# Patient Record
Sex: FEMALE | Race: BLACK OR AFRICAN AMERICAN | NOT HISPANIC OR LATINO | Employment: STUDENT | URBAN - METROPOLITAN AREA
[De-identification: names, ages, dates, MRNs, and addresses within clinical notes are randomized per-mention and may not be internally consistent; named-entity substitution may affect disease eponyms.]

---

## 2018-09-10 ENCOUNTER — OFFICE VISIT (OUTPATIENT)
Dept: OBSTETRICS AND GYNECOLOGY | Facility: CLINIC | Age: 18
End: 2018-09-10
Payer: MEDICAID

## 2018-09-10 VITALS
WEIGHT: 226.06 LBS | HEIGHT: 69 IN | BODY MASS INDEX: 33.48 KG/M2 | SYSTOLIC BLOOD PRESSURE: 110 MMHG | DIASTOLIC BLOOD PRESSURE: 80 MMHG

## 2018-09-10 DIAGNOSIS — Z01.419 ENCOUNTER FOR GYNECOLOGICAL EXAMINATION WITHOUT ABNORMAL FINDING: Primary | ICD-10-CM

## 2018-09-10 PROCEDURE — 88175 CYTOPATH C/V AUTO FLUID REDO: CPT

## 2018-09-10 PROCEDURE — 99385 PREV VISIT NEW AGE 18-39: CPT | Mod: S$PBB,,, | Performed by: OBSTETRICS & GYNECOLOGY

## 2018-09-10 PROCEDURE — 99999 PR PBB SHADOW E&M-EST. PATIENT-LVL III: CPT | Mod: PBBFAC,,, | Performed by: OBSTETRICS & GYNECOLOGY

## 2018-09-10 PROCEDURE — 99213 OFFICE O/P EST LOW 20 MIN: CPT | Mod: PBBFAC,PN | Performed by: OBSTETRICS & GYNECOLOGY

## 2018-09-10 NOTE — PROGRESS NOTES
PT HERE FOR FIRST ANNUAL EXAM.  NO PROBLEMS.    ROS:  GENERAL: No fever, chills, fatigability or weight loss.  VULVAR: No pain, no lesions and no itching.  VAGINAL: No relaxation, no itching, no discharge, no abnormal bleeding and no lesions.  ABDOMEN: No abdominal pain. Denies nausea. Denies vomiting. No diarrhea. No constipation  BREAST: Denies pain. No lumps. No discharge.  URINARY: No incontinence, no nocturia, no frequency and no dysuria.  CARDIOVASCULAR: No chest pain. No shortness of breath. No leg cramps.  NEUROLOGICAL: No headaches. No vision changes.  The remainder of the review of systems was negative.    PE:  General Appearance: overweight And Well developed. Well nourished. In no acute distress.  Vulva: Lesions: No.  Urethral Meatus: Normal size. Normal location. No lesions. No prolapse.  Urethra: No masses. No tenderness. No prolapse. No scarring.  Bladder: No masses. No tenderness.  Vagina: Mucosa NI:yes discharge no, atrophy no, cystocele no or rectocele no.  Cervix: Lesion: no  Stenotic: no Cervical motion tenderness: no  Uterus: Uterus size: 5 weeks. Support good. Uterus size: Normal  Adnexa: Masses: No Tenderness: No CDS Nodularity: No  Abdomen: overweight No masses. No tenderness.  Breasts: No bilateral masses. No bilateral discharge. No bilateral tenderness. No bilateral fibrocystic changes.  Neck: No thyroid enlargement. No thyroid masses.  Skin: Rashes: No      PROCEDURES:    PLAN:     DIAGNOSIS:  1. Encounter for gynecological examination without abnormal finding        MEDICATIONS & ORDERS:  Orders Placed This Encounter    Liquid-based pap smear, screening       Patient was counseled today on the new ACS guidelines for cervical cytology screening as well as the current recommendations for breast cancer screening. She was counseled to follow up with her PCP for other routine health maintenance. Counseling session lasted approximately 10 minutes, and all her questions were answered.          FOLLOW-UP: With me in 12 month

## 2019-10-18 ENCOUNTER — TELEPHONE (OUTPATIENT)
Dept: OBSTETRICS AND GYNECOLOGY | Facility: CLINIC | Age: 19
End: 2019-10-18

## 2019-10-18 ENCOUNTER — OFFICE VISIT (OUTPATIENT)
Dept: OBSTETRICS AND GYNECOLOGY | Facility: CLINIC | Age: 19
End: 2019-10-18
Payer: MEDICAID

## 2019-10-18 VITALS — HEIGHT: 69 IN | BODY MASS INDEX: 30.04 KG/M2 | WEIGHT: 202.81 LBS

## 2019-10-18 DIAGNOSIS — Z01.419 ENCOUNTER FOR GYNECOLOGICAL EXAMINATION WITHOUT ABNORMAL FINDING: ICD-10-CM

## 2019-10-18 PROCEDURE — 99395 PREV VISIT EST AGE 18-39: CPT | Mod: S$PBB,,, | Performed by: OBSTETRICS & GYNECOLOGY

## 2019-10-18 PROCEDURE — 99212 OFFICE O/P EST SF 10 MIN: CPT | Mod: PBBFAC | Performed by: OBSTETRICS & GYNECOLOGY

## 2019-10-18 PROCEDURE — 99395 PR PREVENTIVE VISIT,EST,18-39: ICD-10-PCS | Mod: S$PBB,,, | Performed by: OBSTETRICS & GYNECOLOGY

## 2019-10-18 PROCEDURE — 99999 PR PBB SHADOW E&M-EST. PATIENT-LVL II: CPT | Mod: PBBFAC,,, | Performed by: OBSTETRICS & GYNECOLOGY

## 2019-10-18 PROCEDURE — 99999 PR PBB SHADOW E&M-EST. PATIENT-LVL II: ICD-10-PCS | Mod: PBBFAC,,, | Performed by: OBSTETRICS & GYNECOLOGY

## 2019-10-18 RX ORDER — IBUPROFEN 800 MG/1
800 TABLET ORAL EVERY 6 HOURS PRN
Qty: 60 TABLET | Refills: 6 | Status: SHIPPED | OUTPATIENT
Start: 2019-10-18 | End: 2020-08-06

## 2019-10-18 NOTE — PROGRESS NOTES
PT HERE FOR ANNUAL. WITH WIFE.    ROS:  GENERAL: No fever, chills, fatigability or weight loss.  VULVAR: No pain, no lesions and no itching.  VAGINAL: No relaxation, no itching, no discharge, no abnormal bleeding and no lesions.  ABDOMEN: No abdominal pain. Denies nausea. Denies vomiting. No diarrhea. No constipation  BREAST: Denies pain. No lumps. No discharge.  URINARY: No incontinence, no nocturia, no frequency and no dysuria.  CARDIOVASCULAR: No chest pain. No shortness of breath. No leg cramps.  NEUROLOGICAL: No headaches. No vision changes.  The remainder of the review of systems was negative.    PE:  General Appearance: overweight And Well developed. Well nourished. In no acute distress.  Vulva: Lesions: No.  Urethral Meatus: Normal size. Normal location. No lesions. No prolapse.  Urethra: No masses. No tenderness. No prolapse. No scarring.  Bladder: No masses. No tenderness.  Vagina: Mucosa NI:yes discharge no, atrophy no, cystocele no or rectocele no.  Cervix: Lesion: no  Stenotic: no Cervical motion tenderness: no  Uterus: Uterus size: 5 weeks. Support good. Uterus size: Normal  Adnexa: Masses: No Tenderness: No CDS Nodularity: No  Abdomen: overweight No masses. No tenderness.  Breasts: No bilateral masses. No bilateral discharge. No bilateral tenderness. No bilateral fibrocystic changes.  Neck: No thyroid enlargement. No thyroid masses.  Skin: Rashes: No      PROCEDURES:    PLAN:     DIAGNOSIS:  1. Encounter for gynecological examination without abnormal finding        MEDICATIONS & ORDERS:  Orders Placed This Encounter    ibuprofen (ADVIL,MOTRIN) 800 MG tablet       Patient was counseled today on the new ACS guidelines for cervical cytology screening as well as the current recommendations for breast cancer screening. She was counseled to follow up with her PCP for other routine health maintenance. Counseling session lasted approximately 10 minutes, and all her questions were answered.         FOLLOW-UP:  With me in 12 month

## 2019-10-18 NOTE — TELEPHONE ENCOUNTER
----- Message from Jessi Joselin sent at 10/18/2019  1:24 PM CDT -----  Contact: CHATO WILEY  Type: Patient Call Back    Who called: CHATO WILEY    What is the request in detail: Patient is requesting a call back. She would like to reschedule her appointment but there is no available dates this year. Please advise.     Can the clinic reply by [facility]? No    Best call back number: [phone]    Additional Information: N/A

## 2020-08-06 ENCOUNTER — OFFICE VISIT (OUTPATIENT)
Dept: OBSTETRICS AND GYNECOLOGY | Facility: CLINIC | Age: 20
End: 2020-08-06
Payer: MEDICAID

## 2020-08-06 VITALS
DIASTOLIC BLOOD PRESSURE: 64 MMHG | BODY MASS INDEX: 29.92 KG/M2 | SYSTOLIC BLOOD PRESSURE: 118 MMHG | HEIGHT: 69 IN | WEIGHT: 202 LBS

## 2020-08-06 DIAGNOSIS — Z31.69 ENCOUNTER FOR PRECONCEPTION CONSULTATION: Primary | ICD-10-CM

## 2020-08-06 PROCEDURE — 99999 PR PBB SHADOW E&M-EST. PATIENT-LVL III: ICD-10-PCS | Mod: PBBFAC,,, | Performed by: OBSTETRICS & GYNECOLOGY

## 2020-08-06 PROCEDURE — 99999 PR PBB SHADOW E&M-EST. PATIENT-LVL III: CPT | Mod: PBBFAC,,, | Performed by: OBSTETRICS & GYNECOLOGY

## 2020-08-06 PROCEDURE — 99213 OFFICE O/P EST LOW 20 MIN: CPT | Mod: PBBFAC | Performed by: OBSTETRICS & GYNECOLOGY

## 2020-08-06 PROCEDURE — 99214 OFFICE O/P EST MOD 30 MIN: CPT | Mod: S$PBB,,, | Performed by: OBSTETRICS & GYNECOLOGY

## 2020-08-06 PROCEDURE — 99214 PR OFFICE/OUTPT VISIT, EST, LEVL IV, 30-39 MIN: ICD-10-PCS | Mod: S$PBB,,, | Performed by: OBSTETRICS & GYNECOLOGY

## 2020-08-06 NOTE — PROGRESS NOTES
PT HERE WITH WIFE AND WANTS TO MAKE SURE EVERYTHING IS OK.  DOING HOME IUI.      ROS:  GENERAL: No fever, chills, fatigability or weight loss.  VULVAR: No pain, no lesions and no itching.  VAGINAL: No relaxation, no itching, no discharge, no abnormal bleeding and no lesions.  ABDOMEN: No abdominal pain. Denies nausea. Denies vomiting. No diarrhea. No constipation  BREAST: Denies pain. No lumps. No discharge.  URINARY: No incontinence, no nocturia, no frequency and no dysuria.  CARDIOVASCULAR: No chest pain. No shortness of breath. No leg cramps.  NEUROLOGICAL: No headaches. No vision changes.  The remainder of the review of systems was negative.    PE:  General Appearance: obese And Well developed. Well nourished. In no acute distress.  Vulva: Lesions: No.  Urethral Meatus: Normal size. Normal location. No lesions. No prolapse.  Urethra: No masses. No tenderness. No prolapse. No scarring.  Bladder: No masses. No tenderness.  Vagina: Mucosa NI:yes discharge no, atrophy no, cystocele no or rectocele no.  Cervix: Lesion: no  Stenotic: no Cervical motion tenderness: no  Uterus: Uterus size: 6 weeks. Support good. Uterus size: Normal  Adnexa: Masses: No Tenderness: No CDS Nodularity: No  Abdomen: overweight No masses. No tenderness.  Breasts: No bilateral masses. No bilateral discharge. No bilateral tenderness. No bilateral fibrocystic changes.  Neck: No thyroid enlargement. No thyroid masses.  Skin: Rashes: No  CHEST: CTA B  HEART: RRR  EXT: NO EDEMA        PROCEDURES:    PLAN:     DIAGNOSIS:  1. Encounter for preconception consultation        MEDICATIONS & ORDERS:      20 MIN D/W PT ON PRECONCEPTION    FOLLOW-UP: With me ANNUAL + UPT

## 2020-10-19 ENCOUNTER — NURSE TRIAGE (OUTPATIENT)
Dept: ADMINISTRATIVE | Facility: CLINIC | Age: 20
End: 2020-10-19

## 2020-10-19 NOTE — TELEPHONE ENCOUNTER
Patient reports having 10 positive pregnancy test and is c/o moderate vaginal bleeding. Pregnancy was never confirmed in clinic. Last menstrual cycle was 9/19/2020. Patient also reports cramping and back pain. Will call on call provider.     S/w Dr. Benitez. Recommended that the patient administers ibuprofen 800 mg every 8 hours for pain and f/u with her doctor for possible miscarriage.     Patient updated. Advised the patient to call back with any further questions or if vaginal bleeding worsens.        Reason for Disposition   Nursing judgment    Protocols used: NO GUIDELINE OR REFERENCE [id number]

## 2021-04-22 ENCOUNTER — PATIENT MESSAGE (OUTPATIENT)
Dept: UROLOGY | Facility: CLINIC | Age: 21
End: 2021-04-22

## 2021-04-26 ENCOUNTER — PATIENT MESSAGE (OUTPATIENT)
Dept: RESEARCH | Facility: HOSPITAL | Age: 21
End: 2021-04-26

## 2022-06-20 ENCOUNTER — HOSPITAL ENCOUNTER (EMERGENCY)
Facility: HOSPITAL | Age: 22
Discharge: HOME | End: 2022-06-20
Attending: EMERGENCY MEDICINE
Payer: MEDICAID

## 2022-06-20 VITALS
HEIGHT: 67 IN | SYSTOLIC BLOOD PRESSURE: 118 MMHG | BODY MASS INDEX: 34.53 KG/M2 | OXYGEN SATURATION: 98 % | DIASTOLIC BLOOD PRESSURE: 77 MMHG | RESPIRATION RATE: 15 BRPM | WEIGHT: 220 LBS | HEART RATE: 95 BPM | TEMPERATURE: 98 F

## 2022-06-20 DIAGNOSIS — M25.562 LEFT KNEE PAIN, UNSPECIFIED CHRONICITY: Primary | ICD-10-CM

## 2022-06-20 DIAGNOSIS — T14.90XA INJURY: ICD-10-CM

## 2022-06-20 PROCEDURE — 99283 EMERGENCY DEPT VISIT LOW MDM: CPT

## 2022-06-20 PROCEDURE — 99284 PR EMERGENCY DEPT VISIT,LEVEL IV: ICD-10-PCS | Mod: ,,, | Performed by: EMERGENCY MEDICINE

## 2022-06-20 PROCEDURE — 25000003 PHARM REV CODE 250: Performed by: EMERGENCY MEDICINE

## 2022-06-20 PROCEDURE — 99284 EMERGENCY DEPT VISIT MOD MDM: CPT | Mod: ,,, | Performed by: EMERGENCY MEDICINE

## 2022-06-20 RX ORDER — ACETAMINOPHEN 500 MG
1000 TABLET ORAL
Status: COMPLETED | OUTPATIENT
Start: 2022-06-20 | End: 2022-06-20

## 2022-06-20 RX ADMIN — ACETAMINOPHEN 1000 MG: 500 TABLET ORAL at 10:06

## 2022-06-20 NOTE — ED TRIAGE NOTES
"Pt. Is a 22 y.o. female presenting to the ED via personal transport from home. Pt. Reports being intoxicated 1 1/2 weeks ago and walking down the street when she stepped in a hole and tripped and fell. Pt. Reports feeling her left knee "pop." Now having pain to left knee and swelling.   "

## 2022-06-20 NOTE — DISCHARGE INSTRUCTIONS
you can take Tylenol 500 mg every 4-6 hours and or ibuprofen 400 mg every 6 hours with food for pain control.  You can put weight as tolerated on the left leg.  Please follow-up with orthopedic surgery at [facility]  Return to the emergency department if you develop worsening pain, swelling, fever, chills or any other concerns

## 2022-06-20 NOTE — ED PROVIDER NOTES
"SCRIBE #1 NOTE: I, Valeria Smith, am scribing for, and in the presence of,  Adalgisa Pineda MD. I have scribed the entire note.         CC: Knee Pain (L )      History provided by:   Patient     HPI: Kristi Flores is a 22 y.o. year old female who presents to the ED for left knee pain onset one week ago after injury. The patient reports she was intoxicated when she was walking down the street as she stepped into a deep hole and tripped and fell on the ground. She felt her left knee "pop" as she fell. She is able to bear weight to the left leg but is limping as she walks. Treated the pain with tylenol and Meloxicam with minimal relief to pain. She has not applied ice to the knee. She is now having swelling to the knee. States bending the knee exacerbates her pain. She did not take anything for pain today. Denies weakness or numbness.         PMH: denies any pmh    History reviewed. No pertinent past medical history.  History reviewed. No pertinent surgical history.  Family History   Problem Relation Age of Onset    Breast cancer Neg Hx     Cancer Neg Hx     Colon cancer Neg Hx     Ovarian cancer Neg Hx      No current facility-administered medications on file prior to encounter.     No current outpatient medications on file prior to encounter.     Patient has no known allergies.  Social History     Socioeconomic History    Marital status:    Tobacco Use    Smoking status: Never Smoker    Smokeless tobacco: Never Used   Substance and Sexual Activity    Drug use: Yes     Types: Marijuana     Comment: Reports quit 7/2019    Sexual activity: Yes     Partners: Female     Birth control/protection: None     Comment:  to female        ROS:     Constitutional : neg for fever, neg for weakness  HENT neg for head injury  Resp neg for SOB  Cardiac  neg for chest pain, neg for palpitations  GI neg for abd pain, neg for nausea, neg for vomiting   neg for urinary changes  Neuro neg for focal weakness " or numbness  Skin neg for skin rash  MSK: neg for myalgia, Pos for arthralgia, Pos for left knee swelling   ALL: Patient has no known allergies.    PHYSICAL EXAM:  Vitals:    06/20/22 1131   BP: 118/77   Pulse: 95   Resp: 15   Temp:      VS: triage VS reviewed    general: comfortable, in no acute distress, pleasant, well nourished  HENT: neck symmetric, trachea midline  Resp: comfortable breathing, speaks in full sentences  Neuro: AAO x 3,face symmetric, speech normal. Normal sensation of the lower extremity  MSK: NC/AT, extremities w/out deformity. Ecchymosis and tenderness to palpation over the medial anterior surface of the left knee over the pproximal tibia. No tenderness to palpation over the patella or the joint line. There is full range of motion of the knee and the ankle. +2 DP and PT pulses. Wiggles all toes. Able to walk in the room with full weight on the left leg. Knee stable with valgus and varus stress also neg ant and post drawer test  Skin: warm, dry            DATA & INTERVENTIONS:    LABS reviewed:  Labs Reviewed - No data to display    RADIOLOGY reviewed:  Imaging Results          X-Ray Knee 3 View Left (Final result)  Result time 06/20/22 10:54:35    Final result by Yaniv Luna MD (06/20/22 10:54:35)                 Impression:      See above      Electronically signed by: Yaniv Luna MD  Date:    06/20/2022  Time:    10:54             Narrative:    EXAMINATION:  XR KNEE 3 VIEW LEFT    CLINICAL HISTORY:  Injury, unspecified, initial encounter    TECHNIQUE:  AP, lateral, and Merchant views of the left knee were performed.    COMPARISON:  None    FINDINGS:  No fracture or dislocation.  No bone destruction identified                               X-Ray Tibia Fibula 2 View Left (Final result)  Result time 06/20/22 10:56:58    Final result by Yaniv Luna MD (06/20/22 10:56:58)                 Impression:      See above      Electronically signed by: Yaniv Luna  MD  Date:    06/20/2022  Time:    10:56             Narrative:    EXAMINATION:  XR TIBIA FIBULA 2 VIEW LEFT    CLINICAL HISTORY:  Injury, unspecified, initial encounter    TECHNIQUE:  AP and lateral views of the left tibia and fibula were performed.    COMPARISON:  No 03/03/2016 ne.    FINDINGS:  No fracture or dislocation.  No bone destruction identified.  There is a small calcification noted just below the medial malleolus similar to the previous study of 2016                                MEDICATIONS/FLUIDS:  Medications   acetaminophen tablet 1,000 mg (1,000 mg Oral Given 6/20/22 1019)         MDM:  Kristi Flores is a 22 y.o. year old female who presents to the ED for injury of the left knee approximately 1 week ago after she stepped into a hole with pain over the medial anterior surface of the proximal lower leg of the tibia. I suspect this is medial colateral ligament injury, unlikely fx the patient is able to walk with full weight of the left leg. Will obtain XR. Will give Tylenol for pain control.     DDX includes but not limited to: as above    Medication given in the ED: Tylenol     Imagings independently visualized:   XR left knee: No fracture or dislocation.  XR left tibia fibula: No fracture or dislocation. Small calcification just below the medial malleolus similar to 2016.     Knee immobilizer, crutches, WBAT LLE, referred to Ortho C, pain control and signs and symptoms that would require return to the ED were discussed w/ the patient    IMPRESSION:  1.) left knee injury and pain      Dispo: Discharge    Critical Care Time: N/A     I, Dr. Pineda, personally performed the services described in this documentation. All medical record entries made by the scribe were at my direction and in my presence.  I have reviewed the chart and agree that the record reflects my personal performance and is accurate and complete.     Adalgisa Pineda MD  06/20/22 2464

## 2022-06-20 NOTE — PLAN OF CARE
MARVIN faxed ambulatory referral to [facility] orthopedics.     Becky Harden LMSW  ED   Care Management  [facility]  Ext. [phone]